# Patient Record
Sex: FEMALE | Race: OTHER | HISPANIC OR LATINO | Employment: OTHER | URBAN - METROPOLITAN AREA
[De-identification: names, ages, dates, MRNs, and addresses within clinical notes are randomized per-mention and may not be internally consistent; named-entity substitution may affect disease eponyms.]

---

## 2019-01-29 ENCOUNTER — OFFICE VISIT (OUTPATIENT)
Dept: URGENT CARE | Age: 74
End: 2019-01-29
Payer: OTHER GOVERNMENT

## 2019-01-29 VITALS
HEIGHT: 65 IN | SYSTOLIC BLOOD PRESSURE: 140 MMHG | BODY MASS INDEX: 29.99 KG/M2 | WEIGHT: 180 LBS | TEMPERATURE: 98.9 F | RESPIRATION RATE: 20 BRPM | OXYGEN SATURATION: 95 % | DIASTOLIC BLOOD PRESSURE: 88 MMHG | HEART RATE: 98 BPM

## 2019-01-29 DIAGNOSIS — J06.9 ACUTE UPPER RESPIRATORY INFECTION: Primary | ICD-10-CM

## 2019-01-29 PROCEDURE — G0382 LEV 3 HOSP TYPE B ED VISIT: HCPCS | Performed by: FAMILY MEDICINE

## 2019-01-29 RX ORDER — MONTELUKAST SODIUM 10 MG/1
10 TABLET ORAL
COMMUNITY

## 2019-01-29 RX ORDER — AMOXICILLIN 500 MG/1
500 CAPSULE ORAL EVERY 8 HOURS SCHEDULED
Qty: 30 CAPSULE | Refills: 0 | Status: SHIPPED | OUTPATIENT
Start: 2019-01-29 | End: 2019-02-08

## 2019-01-29 RX ORDER — LEVOTHYROXINE SODIUM 0.05 MG/1
50 TABLET ORAL DAILY
COMMUNITY

## 2019-01-29 RX ORDER — BENZONATATE 200 MG/1
200 CAPSULE ORAL 3 TIMES DAILY PRN
Qty: 30 CAPSULE | Refills: 0 | Status: SHIPPED | OUTPATIENT
Start: 2019-01-29

## 2019-01-29 RX ORDER — CETIRIZINE HYDROCHLORIDE 10 MG/1
10 TABLET, CHEWABLE ORAL DAILY
COMMUNITY

## 2019-01-29 RX ORDER — IRBESARTAN AND HYDROCHLOROTHIAZIDE 150; 12.5 MG/1; MG/1
1 TABLET, FILM COATED ORAL DAILY
COMMUNITY

## 2019-01-29 NOTE — PROGRESS NOTES
[facility]        NAME: Johnnie Kitchen is a 68 y o  female  : 1945    MRN: [id number]  DATE: 2019  TIME: 8:33 AM    Assessment and Plan   Acute upper respiratory infection [J06 9]  1  Acute upper respiratory infection  amoxicillin (AMOXIL) 500 mg capsule    benzonatate (TESSALON) 200 MG capsule         Patient Instructions     Patient Instructions   Rest, limit activity  Amoxicillin 3 times a day until finished (please take probiotics)  Tessalon Perles 2 to 3 times a day (every 8-12 hours) as needed for cough  Tylenol, or ibuprofen (Advil/Motrin) as needed  Recheck/follow-up with family physician as needed  Gabrielle    [phone]    Please go to the hospital emergency department if needed  Follow up with PCP in 3-5 days  Proceed to  ER if symptoms worsen  Chief Complaint     Chief Complaint   Patient presents with    Cold Like Symptoms     x 2-3 days, with chest congestion , c/o chest pain with coughing, and breathing         History of Present Illness       Person with patient used in translation; coughing congestion for the past several days - no improvement with over-the-counter medication        Review of Systems   Review of Systems   HENT: Positive for congestion  Respiratory: Positive for cough  Cardiovascular: Negative  Musculoskeletal: Negative  Chest discomfort with cough   Skin: Negative  Neurological: Negative            Current Medications       Current Outpatient Prescriptions:     cetirizine (ZyrTEC) 10 MG chewable tablet, Chew 10 mg daily, Disp: , Rfl:     irbesartan-hydrochlorothiazide (AVALIDE) 150-12 5 MG per tablet, Take 1 tablet by mouth daily, Disp: , Rfl:     levothyroxine 50 mcg tablet, Take 50 mcg by mouth daily, Disp: , Rfl:     montelukast (SINGULAIR) 10 mg tablet, Take 10 mg by mouth daily at bedtime, Disp: , Rfl:     amoxicillin (AMOXIL) 500 mg capsule, Take 1 capsule (500 mg total) by mouth every 8 (eight) hours for 30 doses, Disp: 30 capsule, Rfl: 0    benzonatate (TESSALON) 200 MG capsule, Take 1 capsule (200 mg total) by mouth 3 (three) times a day as needed for cough for up to 30 doses, Disp: 30 capsule, Rfl: 0    Current Allergies     Allergies as of 2019    (No Known Allergies)            The following portions of the patient's history were reviewed and updated as appropriate: allergies, current medications, past family history, past medical history, past social history, past surgical history and problem list      Past Medical History:   Diagnosis Date    Disease of thyroid gland     Hypertension        Past Surgical History:   Procedure Laterality Date     SECTION      CHOLECYSTECTOMY         No family history on file  Medications have been verified  Objective   /88   Pulse 98   Temp 98 9 °F (37 2 °C) (Temporal)   Resp 20   Ht 5' 5" (1 651 m)   Wt 81 6 kg (180 lb)   SpO2 95%   BMI 29 95 kg/m²        Physical Exam     Physical Exam   Constitutional: She is oriented to person, place, and time  She appears well-developed and well-nourished  Patient appears ill   HENT:   Right Ear: External ear normal    Left Ear: External ear normal    Slight nasal congestion; injection of the oropharynx   Neck: Normal range of motion  Neck supple  Cardiovascular: Normal rate, regular rhythm and normal heart sounds  Pulmonary/Chest: Effort normal and breath sounds normal    Neurological: She is alert and oriented to person, place, and time  No nuchal rigidity   Skin: Skin is warm  Fair color and turgor   Psychiatric: She has a normal mood and affect  Her behavior is normal    Nursing note and vitals reviewed

## 2019-01-29 NOTE — PATIENT INSTRUCTIONS
Rest, limit activity  Amoxicillin 3 times a day until finished (please take probiotics)  Tessalon Perles 2 to 3 times a day (every 8-12 hours) as needed for cough  Tylenol, or ibuprofen (Advil/Motrin) as needed  Recheck/follow-up with family physician as needed  Tamara Regan   [phone]    Please go to the hospital emergency department if needed

## 2022-01-08 ENCOUNTER — IMMUNIZATIONS (OUTPATIENT)
Dept: FAMILY MEDICINE CLINIC | Facility: HOSPITAL | Age: 77
End: 2022-01-08

## 2022-01-08 DIAGNOSIS — Z23 ENCOUNTER FOR IMMUNIZATION: Primary | ICD-10-CM

## 2022-01-08 PROCEDURE — 0001A COVID-19 PFIZER VACC 0.3 ML: CPT

## 2022-01-08 PROCEDURE — 91300 COVID-19 PFIZER VACC 0.3 ML: CPT
